# Patient Record
Sex: FEMALE | Race: WHITE | NOT HISPANIC OR LATINO | Employment: UNEMPLOYED | URBAN - METROPOLITAN AREA
[De-identification: names, ages, dates, MRNs, and addresses within clinical notes are randomized per-mention and may not be internally consistent; named-entity substitution may affect disease eponyms.]

---
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
## 2023-04-07 ENCOUNTER — NURSE TRIAGE (OUTPATIENT)
Dept: NURSING | Facility: CLINIC | Age: 23
End: 2023-04-07
Payer: COMMERCIAL

## 2023-04-07 ENCOUNTER — HOSPITAL ENCOUNTER (EMERGENCY)
Facility: CLINIC | Age: 23
Discharge: HOME | End: 2023-04-07
Attending: EMERGENCY MEDICINE | Admitting: EMERGENCY MEDICINE
Payer: COMMERCIAL

## 2023-04-07 VITALS
HEIGHT: 71 IN | SYSTOLIC BLOOD PRESSURE: 135 MMHG | HEART RATE: 112 BPM | OXYGEN SATURATION: 97 % | TEMPERATURE: 98 F | RESPIRATION RATE: 18 BRPM | DIASTOLIC BLOOD PRESSURE: 93 MMHG | WEIGHT: 293 LBS | BODY MASS INDEX: 41.02 KG/M2

## 2023-04-07 DIAGNOSIS — H66.90 ACUTE OTITIS MEDIA, UNSPECIFIED OTITIS MEDIA TYPE: ICD-10-CM

## 2023-04-07 DIAGNOSIS — B30.9 VIRAL CONJUNCTIVITIS: ICD-10-CM

## 2023-04-07 DIAGNOSIS — J06.9 VIRAL URI: ICD-10-CM

## 2023-04-07 PROCEDURE — 99284 EMERGENCY DEPT VISIT MOD MDM: CPT | Performed by: EMERGENCY MEDICINE

## 2023-04-07 PROCEDURE — 250N000013 HC RX MED GY IP 250 OP 250 PS 637: Performed by: EMERGENCY MEDICINE

## 2023-04-07 PROCEDURE — 99283 EMERGENCY DEPT VISIT LOW MDM: CPT | Performed by: EMERGENCY MEDICINE

## 2023-04-07 RX ORDER — AMOXICILLIN 500 MG/1
1000 CAPSULE ORAL ONCE
Status: COMPLETED | OUTPATIENT
Start: 2023-04-07 | End: 2023-04-07

## 2023-04-07 RX ORDER — AMOXICILLIN 500 MG/1
1000 CAPSULE ORAL 2 TIMES DAILY
Qty: 40 CAPSULE | Refills: 0 | Status: SHIPPED | OUTPATIENT
Start: 2023-04-07 | End: 2023-04-17

## 2023-04-07 RX ORDER — ACETAMINOPHEN 500 MG
1000 TABLET ORAL ONCE
Status: COMPLETED | OUTPATIENT
Start: 2023-04-07 | End: 2023-04-07

## 2023-04-07 RX ORDER — IBUPROFEN 600 MG/1
600 TABLET, FILM COATED ORAL EVERY 6 HOURS PRN
Qty: 20 TABLET | Refills: 0 | Status: SHIPPED | OUTPATIENT
Start: 2023-04-07

## 2023-04-07 RX ADMIN — ACETAMINOPHEN 1000 MG: 500 TABLET ORAL at 02:57

## 2023-04-07 RX ADMIN — AMOXICILLIN 1000 MG: 500 CAPSULE ORAL at 02:57

## 2023-04-07 NOTE — DISCHARGE INSTRUCTIONS
Continue your Afrin (for 3 days maximum), sudafed, ibuprofen. You can also use tylenol as needed. Use the antibiotic as prescribed. Follow up with your clinic doctor Monday or Tuesday if not improving. Return to the ER with any new or worsening symptoms or any other concerns.

## 2023-04-07 NOTE — TELEPHONE ENCOUNTER
Pt was having sinus pressure and a sore throat. Pt was not having ear pain until tonight in which now she is having searing pain in her right ear. She is very tearful on the phone. She states that she has been taking ibuprofen and aleve (educated her that these are in the same class of medications so should not take them at the same time), but the medications have not been helping to relieve her pain at all. She is also using a warm compress with no relief. Recommended she be seen in the next 4 hours, since it is after midnight she would need to go to the ER.     Reason for Disposition    [1] SEVERE pain AND [2] not improved 2 hours after taking analgesic medication (e.g., ibuprofen or acetaminophen)    Additional Information    Negative: [1] Stiff neck (can't touch chin to chest) AND [2] fever    Negative: [1] Bony area of skull behind the ear is pink or swollen AND [2] fever    Negative: Fever > 104 F (40 C)    Negative: Patient sounds very sick or weak to the triager    Protocols used: EARACHE-A-AH    Candis Elizalde RN on 4/7/2023 at 12:30 AM

## 2023-04-07 NOTE — ED TRIAGE NOTES
Patient presents to the ER with right ear pain that started today. She admits to being seen at [facility] yesterday and testing negative for flu and covid. But started having right ear pain and eye discharge with swelling today.      Triage Assessment     Row Name 04/07/23 0121       Triage Assessment (Adult)    Airway WDL WDL       Respiratory WDL    Respiratory WDL WDL       Skin Circulation/Temperature WDL    Skin Circulation/Temperature WDL WDL       Cardiac WDL    Cardiac WDL WDL       Peripheral/Neurovascular WDL    Peripheral Neurovascular WDL WDL       Cognitive/Neuro/Behavioral WDL    Cognitive/Neuro/Behavioral WDL WDL

## 2023-04-07 NOTE — ED PROVIDER NOTES
ED Provider Note  [facility]      History     Chief Complaint   Patient presents with     Otalgia     The history is provided by the patient and medical records.     Dafne Knapp is a 22 year old female who presents to the emergency department today with primary complaint of ear pain on the right and drainage from the right eye.  She states she started having URI symptoms, notably severe sinus pressure and congestion, on Monday night (3 nights ago).  She states that she went to [facility] yesterday and tested negative for influenza, and COVID.  She states that the doctor who saw her asked if she was having right ear pain, but at the time she was not.  They told her to use Afrin and Sudafed.  She states that after that she started having right ear pain, and also started having some clear drainage from her right eye.  No real pain in the eye, no blurred vision.  She states that tonight the ear pain has become severe.  She did try an Aleve a couple of hours ago.  She states she felt feverish the first day of her symptoms but none since then.  She is not really having much cough.  No shortness of breath, abdominal pain, vomiting, diarrhea.  No other complaints.     This part of the medical record was transcribed by Piedad Crane Medical Scribe, from a dictation done by Karie Acuna MD.     Past Medical History  History reviewed. No pertinent past medical history.  History reviewed. No pertinent surgical history.  amoxicillin (AMOXIL) 500 MG capsule  ibuprofen (ADVIL/MOTRIN) 600 MG tablet  acetaminophen-codeine 300-30 MG per tablet  ORDER FOR DME      No Known Allergies  Family History  No family history on file.  Social History   Social History     Tobacco Use     Smoking status: Never   Substance Use Topics     Alcohol use: No     Drug use: No         A medically appropriate review of systems was performed with pertinent positives and negatives noted in the HPI, and all other  "systems negative.    Physical Exam   BP: (!) 135/93  Pulse: 112  Temp: 98  F (36.7  C)  Resp: 18  Height: 180.3 cm (5' 11\")  Weight: 136.1 kg (300 lb)  SpO2: 97 %  Physical Exam  Constitutional:       General: She is not in acute distress.     Appearance: She is not diaphoretic.   HENT:      Head: Atraumatic.      Ears:      Comments: Right TM erythematous and bulging.      Mouth/Throat:      Pharynx: No oropharyngeal exudate.   Eyes:      General: No scleral icterus.     Pupils: Pupils are equal, round, and reactive to light.      Comments: Conjunctival injection on the right with very mild lid swelling, clear tearing   Cardiovascular:      Heart sounds: Normal heart sounds.   Pulmonary:      Effort: No respiratory distress.      Breath sounds: Normal breath sounds.   Abdominal:      Palpations: Abdomen is soft.      Tenderness: There is no abdominal tenderness.   Musculoskeletal:         General: No deformity.   Skin:     General: Skin is warm.           ED Course, Procedures, & Data      Procedures                     No results found for any visits on 04/07/23.  Medications   acetaminophen (TYLENOL) tablet 1,000 mg (1,000 mg Oral $Given 4/7/23 0257)   amoxicillin (AMOXIL) capsule 1,000 mg (1,000 mg Oral $Given 4/7/23 0257)     Labs Ordered and Resulted from Time of ED Arrival to Time of ED Departure - No data to display  No orders to display          Critical care was not performed.     Medical Decision Making  The patient's presentation was of moderate complexity (an acute illness with systemic symptoms).    The patient's evaluation involved:  history and exam without other MDM data elements    The patient's management necessitated moderate risk (prescription drug management including medications given in the ED).      Assessment & Plan    The right TM is erythematous and bulging, with a little bit of fluid seen behind it as well.  I will treat for otitis with amoxicillin, will give her a dose here.  She did " take Aleve within the last couple of hours.  I will give her Tylenol.  She is encouraged to continue Afrin and Sudafed as I do think that that would be helpful as well.  I will prescribe high-dose ibuprofen as well.  As far as conjunctivitis, it is very likely viral.  I did discuss the infectious nature of this with her including the fact that she can auto transmit to her other eye.  Good hand hygiene was discussed.  She is encouraged to follow-up with primary care if not improving, return to the ER with any new or worsening symptoms, any other concerns.  Nothing to suggest mastoiditis, malignant otitis, or any other potentially life-threatening or serious etiology at this time.      This part of the medical record was transcribed by Piedad Crane Medical Scribe, from a dictation done by Karie Acuna MD.     I have reviewed the nursing notes. I have reviewed the findings, diagnosis, plan and need for follow up with the patient.    Discharge Medication List as of 4/7/2023  1:57 AM      START taking these medications    Details   amoxicillin (AMOXIL) 500 MG capsule Take 2 capsules (1,000 mg) by mouth 2 times daily for 10 days, Disp-40 capsule, R-0, Local Print             Final diagnoses:   Acute otitis media, unspecified otitis media type   Viral URI   Viral conjunctivitis       Karie Acuna MD  [facility] EMERGENCY DEPARTMENT  4/7/2023     Karie Acuna MD  04/07/23 0500

## 2023-09-01 ENCOUNTER — OFFICE VISIT (OUTPATIENT)
Dept: PEDIATRICS | Facility: CLINIC | Age: 23
End: 2023-09-01
Payer: COMMERCIAL

## 2023-09-01 ENCOUNTER — OFFICE VISIT (OUTPATIENT)
Dept: URGENT CARE | Facility: URGENT CARE | Age: 23
End: 2023-09-01
Payer: COMMERCIAL

## 2023-09-01 ENCOUNTER — ANCILLARY PROCEDURE (OUTPATIENT)
Dept: ULTRASOUND IMAGING | Facility: CLINIC | Age: 23
End: 2023-09-01
Attending: EMERGENCY MEDICINE
Payer: COMMERCIAL

## 2023-09-01 VITALS
HEART RATE: 94 BPM | OXYGEN SATURATION: 99 % | TEMPERATURE: 97.3 F | BODY MASS INDEX: 40.45 KG/M2 | WEIGHT: 290 LBS | SYSTOLIC BLOOD PRESSURE: 130 MMHG | DIASTOLIC BLOOD PRESSURE: 90 MMHG | RESPIRATION RATE: 18 BRPM

## 2023-09-01 VITALS
BODY MASS INDEX: 40.6 KG/M2 | RESPIRATION RATE: 18 BRPM | HEIGHT: 71 IN | WEIGHT: 290 LBS | HEART RATE: 90 BPM | SYSTOLIC BLOOD PRESSURE: 126 MMHG | OXYGEN SATURATION: 99 % | TEMPERATURE: 97.3 F | DIASTOLIC BLOOD PRESSURE: 82 MMHG

## 2023-09-01 DIAGNOSIS — L03.116 CELLULITIS OF LEFT LOWER EXTREMITY: ICD-10-CM

## 2023-09-01 DIAGNOSIS — M79.662 PAIN OF LEFT LOWER LEG: ICD-10-CM

## 2023-09-01 DIAGNOSIS — R21 RASH AND NONSPECIFIC SKIN ERUPTION: ICD-10-CM

## 2023-09-01 DIAGNOSIS — M79.89 LEFT LEG SWELLING: Primary | ICD-10-CM

## 2023-09-01 DIAGNOSIS — M79.89 LEFT LEG SWELLING: ICD-10-CM

## 2023-09-01 LAB
ANION GAP SERPL CALCULATED.3IONS-SCNC: 11 MMOL/L (ref 7–15)
BASOPHILS # BLD AUTO: 0 10E3/UL (ref 0–0.2)
BASOPHILS NFR BLD AUTO: 1 %
BUN SERPL-MCNC: 13.7 MG/DL (ref 6–20)
CALCIUM SERPL-MCNC: 9.3 MG/DL (ref 8.6–10)
CHLORIDE SERPL-SCNC: 104 MMOL/L (ref 98–107)
CREAT SERPL-MCNC: 0.91 MG/DL (ref 0.51–0.95)
CRP SERPL-MCNC: 7.39 MG/L
DEPRECATED HCO3 PLAS-SCNC: 25 MMOL/L (ref 22–29)
EOSINOPHIL # BLD AUTO: 0.1 10E3/UL (ref 0–0.7)
EOSINOPHIL NFR BLD AUTO: 2 %
ERYTHROCYTE [DISTWIDTH] IN BLOOD BY AUTOMATED COUNT: 12 % (ref 10–15)
ERYTHROCYTE [SEDIMENTATION RATE] IN BLOOD BY WESTERGREN METHOD: 17 MM/HR (ref 0–20)
GFR SERPL CREATININE-BSD FRML MDRD: >90 ML/MIN/1.73M2
GLUCOSE SERPL-MCNC: 94 MG/DL (ref 70–99)
HCT VFR BLD AUTO: 40.9 % (ref 35–47)
HGB BLD-MCNC: 13.5 G/DL (ref 11.7–15.7)
IMM GRANULOCYTES # BLD: 0 10E3/UL
IMM GRANULOCYTES NFR BLD: 0 %
LYMPHOCYTES # BLD AUTO: 1.7 10E3/UL (ref 0.8–5.3)
LYMPHOCYTES NFR BLD AUTO: 30 %
MCH RBC QN AUTO: 29 PG (ref 26.5–33)
MCHC RBC AUTO-ENTMCNC: 33 G/DL (ref 31.5–36.5)
MCV RBC AUTO: 88 FL (ref 78–100)
MONOCYTES # BLD AUTO: 0.5 10E3/UL (ref 0–1.3)
MONOCYTES NFR BLD AUTO: 8 %
NEUTROPHILS # BLD AUTO: 3.4 10E3/UL (ref 1.6–8.3)
NEUTROPHILS NFR BLD AUTO: 59 %
PLATELET # BLD AUTO: 287 10E3/UL (ref 150–450)
POTASSIUM SERPL-SCNC: 4.4 MMOL/L (ref 3.4–5.3)
RBC # BLD AUTO: 4.65 10E6/UL (ref 3.8–5.2)
SODIUM SERPL-SCNC: 140 MMOL/L (ref 136–145)
WBC # BLD AUTO: 5.8 10E3/UL (ref 4–11)

## 2023-09-01 PROCEDURE — 99204 OFFICE O/P NEW MOD 45 MIN: CPT | Performed by: EMERGENCY MEDICINE

## 2023-09-01 PROCEDURE — 85025 COMPLETE CBC W/AUTO DIFF WBC: CPT | Performed by: EMERGENCY MEDICINE

## 2023-09-01 PROCEDURE — 36415 COLL VENOUS BLD VENIPUNCTURE: CPT | Performed by: EMERGENCY MEDICINE

## 2023-09-01 PROCEDURE — 86140 C-REACTIVE PROTEIN: CPT | Performed by: EMERGENCY MEDICINE

## 2023-09-01 PROCEDURE — 99207 REFERRAL TO ACUTE AND DIAGNOSTIC SERVICES: CPT | Performed by: PHYSICIAN ASSISTANT

## 2023-09-01 PROCEDURE — 80048 BASIC METABOLIC PNL TOTAL CA: CPT | Performed by: EMERGENCY MEDICINE

## 2023-09-01 PROCEDURE — 85652 RBC SED RATE AUTOMATED: CPT | Performed by: EMERGENCY MEDICINE

## 2023-09-01 PROCEDURE — 93971 EXTREMITY STUDY: CPT | Mod: LT

## 2023-09-01 RX ORDER — SULFAMETHOXAZOLE/TRIMETHOPRIM 800-160 MG
2 TABLET ORAL 2 TIMES DAILY
Qty: 28 TABLET | Refills: 0 | Status: SHIPPED | OUTPATIENT
Start: 2023-09-01 | End: 2023-09-08

## 2023-09-01 RX ORDER — LAMOTRIGINE 5 MG/1
5 TABLET, CHEWABLE ORAL DAILY
COMMUNITY

## 2023-09-01 RX ORDER — BUPROPION HYDROCHLORIDE 300 MG/1
300 TABLET ORAL EVERY MORNING
COMMUNITY

## 2023-09-01 RX ORDER — GABAPENTIN 300 MG/1
TABLET, FILM COATED ORAL
COMMUNITY

## 2023-09-01 RX ORDER — CEPHALEXIN 500 MG/1
CAPSULE ORAL
COMMUNITY
Start: 2023-08-29

## 2023-09-01 NOTE — PROGRESS NOTES
Impression:  Left leg rash with leg swelling.  Venous Dopplers negative for DVT.  She has been treated with Keflex without improvement.  May have cellulitis unresponsive or partially responsive to the Keflex.  Could possibly due to MRSA.  Differential diagnosis also includes some inflammatory lesion or panniculitis.  Sed rate and CBC are normal.  Metabolic panel is normal.  The CRP is slightly elevated    Plan:  Continue the Keflex, add Bactrim and high-dose in case she has some MRSA.  Elevate the leg Tylenol as needed for pain.  If the rash is not improving we will refer her to dermatology for further evaluation and consideration of possible skin biopsy      Chief Complaint:  Patient presents with:  Leg Swelling         HPI:   Dafne Knapp is a 23 year old female who presents to this clinic for the evaluation of left leg swelling and redness.  Patient complains of 1 week history of some swelling and redness on her left lower leg.  She was seen in clinic earlier today and referred her for further evaluation and rule out DVT.  She first noticed some erythema and bruising on her left posterior calf area about 1 week ago.  She did not have any trauma to the area.  The leg became slightly swollen.  She was seen in a clinic and started on Keflex but has not improved.  She does not recall any insect bites to the area.  She had no trauma to the area.  Today she noticed some erythema on the anterior aspect of her leg and presented for further evaluation.  She does not have any chest pain or shortness of breath or fever.  No symptoms on the right leg.  No other skin rash.      PMH:   No past medical history on file.  No past surgical history on file.      ROS:  All other systems negative    Meds:    Current Outpatient Medications:     acetaminophen-codeine 300-30 MG per tablet, Take 1-2 tablets by mouth every 4 hours as needed for pain, Disp: 10 tablet, Rfl: 0    buPROPion (WELLBUTRIN XL) 300 MG 24 hr tablet, Take 300 mg  "by mouth every morning, Disp: , Rfl:     cephALEXin (KEFLEX) 500 MG capsule, TAKE ONE Capsule (500mg) BY MOUTH FOUR TIMES DAILY FOR 7 DAYS, Disp: , Rfl:     gabapentin (GRALISE) 300 MG 24 hr tablet, Take by mouth daily (with dinner), Disp: , Rfl:     ibuprofen (ADVIL/MOTRIN) 600 MG tablet, Take 1 tablet (600 mg) by mouth every 6 hours as needed for moderate pain, Disp: 20 tablet, Rfl: 0    lamoTRIgine (LAMICTAL) 5 MG chewable tablet, Take 5 mg by mouth daily, Disp: , Rfl:     sulfamethoxazole-trimethoprim (BACTRIM DS) 800-160 MG tablet, Take 2 tablets by mouth 2 times daily for 7 days, Disp: 28 tablet, Rfl: 0    ORDER FOR DME, CRUTCHES (Patient not taking: Reported on 7/5/2017), Disp: 2 Units, Rfl: 0        Social:  Social History     Socioeconomic History    Marital status: Single     Spouse name: Not on file    Number of children: Not on file    Years of education: Not on file    Highest education level: Not on file   Occupational History    Not on file   Tobacco Use    Smoking status: Never    Smokeless tobacco: Not on file   Substance and Sexual Activity    Alcohol use: No    Drug use: No    Sexual activity: Never   Other Topics Concern    Not on file   Social History Narrative    Not on file     Social Determinants of Health     Financial Resource Strain: Not on file   Food Insecurity: Not on file   Transportation Needs: Not on file   Physical Activity: Not on file   Stress: Not on file   Social Connections: Not on file   Intimate Partner Violence: Not on file   Housing Stability: Not on file         Physical Exam:  Vitals:    09/01/23 1453   BP: 126/82   BP Location: Left arm   Patient Position: Sitting   Cuff Size: Adult Large   Pulse: 90   Resp: 18   Temp: 97.3  F (36.3  C)   SpO2: 99%   Weight: 131.5 kg (290 lb)   Height: 1.803 m (5' 11\")      Patient is awake, alert, no distress  Eyes: PERRL, EOMI  Head: Atraumatic and normocephalic  Pharynx: Clear, airway patent  Neck: No mass or tenderness  Lungs: Clear " without distress  CV: Regular without murmur  Extremities: There is trace pedal edema on the left leg, no Homans' sign or cord or tenderness.  Skin: There is some dusky erythema with poorly defined borders on the posterior aspect of the left calf.  There is vague patchy erythema on the anterior left shin with poorly defined margins.  There is no fluctuance.  There is no tenderness.  No other lesions  Neuro: Normal motor and sensory function in all extremities  Psych: Awake, alert, normally responsive      Results:    Results for orders placed or performed in visit on 09/01/23 (from the past 24 hour(s))   Basic metabolic panel   Result Value Ref Range    Sodium 140 136 - 145 mmol/L    Potassium 4.4 3.4 - 5.3 mmol/L    Chloride 104 98 - 107 mmol/L    Carbon Dioxide (CO2) 25 22 - 29 mmol/L    Anion Gap 11 7 - 15 mmol/L    Urea Nitrogen 13.7 6.0 - 20.0 mg/dL    Creatinine 0.91 0.51 - 0.95 mg/dL    Calcium 9.3 8.6 - 10.0 mg/dL    Glucose 94 70 - 99 mg/dL    GFR Estimate >90 >60 mL/min/1.73m2   CBC with platelets differential    Narrative    The following orders were created for panel order CBC with platelets differential.  Procedure                               Abnormality         Status                     ---------                               -----------         ------                     CBC with platelets and d...[[id number]]                      Final result                 Please view results for these tests on the individual orders.   Erythrocyte sedimentation rate auto   Result Value Ref Range    Erythrocyte Sedimentation Rate 17 0 - 20 mm/hr   CRP inflammation   Result Value Ref Range    CRP Inflammation 7.39 (H) <5.00 mg/L   CBC with platelets and differential   Result Value Ref Range    WBC Count 5.8 4.0 - 11.0 10e3/uL    RBC Count 4.65 3.80 - 5.20 10e6/uL    Hemoglobin 13.5 11.7 - 15.7 g/dL    Hematocrit 40.9 35.0 - 47.0 %    MCV 88 78 - 100 fL    MCH 29.0 26.5 - 33.0 pg    MCHC 33.0 31.5 - 36.5 g/dL    RDW  12.0 10.0 - 15.0 %    Platelet Count 287 150 - 450 10e3/uL    % Neutrophils 59 %    % Lymphocytes 30 %    % Monocytes 8 %    % Eosinophils 2 %    % Basophils 1 %    % Immature Granulocytes 0 %    Absolute Neutrophils 3.4 1.6 - 8.3 10e3/uL    Absolute Lymphocytes 1.7 0.8 - 5.3 10e3/uL    Absolute Monocytes 0.5 0.0 - 1.3 10e3/uL    Absolute Eosinophils 0.1 0.0 - 0.7 10e3/uL    Absolute Basophils 0.0 0.0 - 0.2 10e3/uL    Absolute Immature Granulocytes 0.0 <=0.4 10e3/uL              Anand Sylvester MD

## 2023-09-01 NOTE — PROGRESS NOTES
"  Assessment & Plan     Left leg swelling    Referral to [facility] for DVT rule ot  - Referral to Acute and Diagnostic Services (Day of diagnostic / First order acute); Future    Pain of left lower leg    Ultrasound neede  - Referral to Acute and Diagnostic Services (Day of diagnostic / First order acute); Future    Rash and nonspecific skin eruption    May need vasculitis, cellulitis evaluation  - Referral to Acute and Diagnostic Services (Day of diagnostic / First order acute); Future    Review of external notes as documented elsewhere in note       BMI:   Estimated body mass index is 40.45 kg/m  as calculated from the following:    Height as of 4/7/23: 1.803 m (5' 11\").    Weight as of this encounter: 131.5 kg (290 lb).       CONSULTATION/REFERRAL to [facility]    No follow-ups on file.    Etienne Galloway [facility], PA-C  [facility] [place]    Munira Leos is a 23 year old, presenting for the following health issues:  Urgent Care and Rash (Since Monday, pt has foot pain and rash behind foot. Worsening Tuesday, swelling, tender, pain on foot and traveling up. )      HPI   Review of Systems   Constitutional, HEENT, cardiovascular, pulmonary, gi and gu systems are negative, except as otherwise noted.      Objective    BP (!) 130/90   Pulse 94   Temp 97.3  F (36.3  C) (Temporal)   Resp 18   Wt 131.5 kg (290 lb)   SpO2 99%   BMI 40.45 kg/m    Body mass index is 40.45 kg/m .  Physical Exam   GENERAL: healthy, alert and no distress  MS: Positive for left lower leg swelling, calf tenderness  SKIN: Positive for mottling of skin with rash around around  NEURO: Normal strength and tone, mentation intact and speech normal  PSYCH: mentation appears normal, affect normal/bright                      "

## 2023-09-01 NOTE — PROGRESS NOTES
{PROVIDER CHARTING PREFERENCE:783144}    Subjective   Dafne is a 23 year old, presenting for the following health issues:  Leg Swelling      HPI     Evaluation for possible DVT  Onset/Duration: 4 days  Description:       Location: left lower leg       Redness: YES       Pain: mild, moderate       Warmth: YES       Joint swelling no   Progression of symptoms worse  Accompanying signs and symptoms:       Fevers: no        Numbness/tingling/weakness: YES       Chest pain/pleurisy: no        Shortness of breath: no   History        Trauma: no         Recent travel/when: no         Previous history of DVT: no         Family history of DVT: Possible grandmother on mom's side        Recent surgery: no   Aggravating factors include: pain increases with pressure on leg  Therapies tried and outcome: ice and elevation  Prior surgery on arteries of veins in this area: No  {Link to age adjusted D-dimer (UTD)   :288081}        Review of Systems   {ROS COMP (Optional):793479}      Objective    There were no vitals taken for this visit.  There is no height or weight on file to calculate BMI.  Physical Exam   {Exam List (Optional):728103}    {Diagnostic Test Results (Optional):283314}    {AMBULATORY ATTESTATION (Optional):727285}

## 2023-09-01 NOTE — PATIENT INSTRUCTIONS
Call for a dermatology appointment    [facility] dermatology [phone]  [facility] dermat[facility] [phone]  [facility] [phone]  [facility] dermatology [phone]  [facility]ology consultants [phone]   dermatology [phone]  [facility] [phone]  Dermatology specialist [phone]   [facility] dermatology [phone]  [facility] dermatology [phone]   [facility] [phone]  [facility] dermatology [phone]   [place] for dermatology [phone]   [facility] dermatology [phone]

## 2023-12-10 ENCOUNTER — HEALTH MAINTENANCE LETTER (OUTPATIENT)
Age: 23
End: 2023-12-10

## 2025-01-11 ENCOUNTER — HEALTH MAINTENANCE LETTER (OUTPATIENT)
Age: 25
End: 2025-01-11